# Patient Record
Sex: MALE | Race: WHITE | Employment: UNEMPLOYED | URBAN - NONMETROPOLITAN AREA
[De-identification: names, ages, dates, MRNs, and addresses within clinical notes are randomized per-mention and may not be internally consistent; named-entity substitution may affect disease eponyms.]

---

## 2020-06-11 ENCOUNTER — OFFICE VISIT (OUTPATIENT)
Age: 35
End: 2020-06-11

## 2020-06-11 VITALS — OXYGEN SATURATION: 98 % | TEMPERATURE: 97.8 F | HEART RATE: 86 BPM

## 2020-06-11 NOTE — PROGRESS NOTES
Patient was not seen//assessed by provider in the flu clinic today. COVID swab was order in compliance with requirement for testing prior to surgery or invasive procedure. Nasopharyngeal swab was collected and ordered through [facility] vendor.

## 2020-06-14 LAB
REPORT: NORMAL
SARS-COV-2: NOT DETECTED
THIS TEST SENT TO: NORMAL

## 2024-06-24 ENCOUNTER — OFFICE VISIT (OUTPATIENT)
Age: 39
End: 2024-06-24
Payer: COMMERCIAL

## 2024-06-24 VITALS
WEIGHT: 189 LBS | SYSTOLIC BLOOD PRESSURE: 128 MMHG | RESPIRATION RATE: 20 BRPM | OXYGEN SATURATION: 98 % | TEMPERATURE: 98.3 F | HEART RATE: 95 BPM | HEIGHT: 73 IN | BODY MASS INDEX: 25.05 KG/M2 | DIASTOLIC BLOOD PRESSURE: 78 MMHG

## 2024-06-24 DIAGNOSIS — S61.330A: Primary | ICD-10-CM

## 2024-06-24 PROCEDURE — 90715 TDAP VACCINE 7 YRS/> IM: CPT

## 2024-06-24 PROCEDURE — 90471 IMMUNIZATION ADMIN: CPT

## 2024-06-24 PROCEDURE — 99212 OFFICE O/P EST SF 10 MIN: CPT

## 2024-06-24 ASSESSMENT — ENCOUNTER SYMPTOMS: COLOR CHANGE: 0

## 2024-06-24 NOTE — PATIENT INSTRUCTIONS
- Tetanus vaccine administered during clinic visit.  - Wound covered with band aide.  - Keep area clean and dry.  - Cover while working.  - Cleanse with antimicrobial soap and water.  - Monitor for any development of swelling, erythema or drainage.    - Tetanus injection administered during clinic visit.   - Pain, irritation, and swelling at injection site can be normal.   - Risks and benefits discussed with patient.   - Patient instructed to wait in clinic 15 minutes after administration.    - Return to the clinic or follow up with PCP if symptoms worsen or fail to improve.

## 2024-06-24 NOTE — PROGRESS NOTES
[facility]  [facility]  [address] [place] [id number]  Dept: [phone]  Dept Fax: [phone]  Loc: [phone]    Lane Bailey is a 39 y.o. male who presents today for his medical conditions/complaints as noted below.  Lane Bailey is c/o of Puncture Wound (Paper clip through and through right index)        HPI:     Lane Bailey presents with complaints of puncture to right index finger by paperclip. Reports he pulled object out of finger, object still intact. Reports bleeding is controlled. Denies any recent antibiotic or steroid administration. Reports tetanus vaccine is not UTD.      History reviewed. No pertinent past medical history.  Past Surgical History:   Procedure Laterality Date    TONSILLECTOMY      WISDOM TOOTH EXTRACTION         History reviewed. No pertinent family history.    Social History     Tobacco Use    Smoking status: Never    Smokeless tobacco: Never   Substance Use Topics    Alcohol use: Yes     Alcohol/week: 10.0 standard drinks of alcohol     Types: 10 Cans of beer per week      No current outpatient medications on file.     No current facility-administered medications for this visit.     Allergies   Allergen Reactions    Sulfa Antibiotics      Been told since childhood       Health Maintenance   Topic Date Due    Varicella vaccine (1 of 2 - 2-dose childhood series) Never done    Depression Screen  Never done    HIV screen  Never done    Hepatitis C screen  Never done    DTaP/Tdap/Td vaccine (1 - Tdap) Never done    COVID-19 Vaccine (3 - 2023-24 season) 09/01/2023    Flu vaccine (Season Ended) 08/01/2024    Hepatitis B vaccine  Completed    Hepatitis A vaccine  Aged Out    Hib vaccine  Aged Out    HPV vaccine  Aged Out    Polio vaccine  Aged Out    Meningococcal (ACWY) vaccine  Aged Out    Pneumococcal 0-64 years Vaccine  Aged Out       Subjective:     Review of Systems   Constitutional:  Negative for chills and fever.

## 2024-07-03 ENCOUNTER — OFFICE VISIT (OUTPATIENT)
Age: 39
End: 2024-07-03
Payer: COMMERCIAL

## 2024-07-03 VITALS
DIASTOLIC BLOOD PRESSURE: 84 MMHG | BODY MASS INDEX: 24.94 KG/M2 | OXYGEN SATURATION: 98 % | TEMPERATURE: 97.6 F | SYSTOLIC BLOOD PRESSURE: 130 MMHG | HEART RATE: 92 BPM | RESPIRATION RATE: 20 BRPM | WEIGHT: 189 LBS

## 2024-07-03 DIAGNOSIS — S50.02XA CONTUSION OF LEFT ELBOW, INITIAL ENCOUNTER: ICD-10-CM

## 2024-07-03 DIAGNOSIS — S01.01XA LACERATION OF SCALP WITHOUT FOREIGN BODY, INITIAL ENCOUNTER: Primary | ICD-10-CM

## 2024-07-03 PROCEDURE — 99213 OFFICE O/P EST LOW 20 MIN: CPT

## 2024-07-03 ASSESSMENT — ENCOUNTER SYMPTOMS
COLOR CHANGE: 0
SHORTNESS OF BREATH: 0

## 2024-07-03 NOTE — PROGRESS NOTES
alert.   Psychiatric:         Mood and Affect: Mood normal.           /84   Pulse 92   Temp 97.6 °F (36.4 °C) (Temporal)   Resp 20   Wt 85.7 kg (189 lb)   SpO2 98%   BMI 24.94 kg/m²     :Assessment   Assessment & Plan    Diagnosis Orders   1. Laceration of scalp without foreign body, initial encounter  mupirocin (BACTROBAN) 2 % ointment      2. Contusion of left elbow, initial encounter  mupirocin (BACTROBAN) 2 % ointment          :Plan   Lacerations appears to be well approximated with edges starting to adhere together appropriately. No sutures necessary. Dermabond applied to left scalp laceration for more adherence of wound edges. No apparent bleeding or signs of infection noted. No x-ray warranted for left elbow injury, several abrasions present without signs of injection.    - Apply Mupirocin ointment as discussed.   - Leave the skin adhesive on your skin until it falls off on its own. This may take 5 to 10 days.  - Do not scratch, rub, or pick at the adhesive.  - Do not put the sticky part of a bandage directly on the adhesive.  - Tetanus vaccine UTD.  - Monitor for any signs of wound infection such as erythema, swelling or purulent drainage.  - Return to the clinic or follow up with PCP if symptoms worsen or fail to improve.     Patient provided educational materials- see patient instructions.  Discussed administration, benefit, and side effects of any prescribed or OTC medications.  All patient questions answered appropriately.  Patient voiced understanding.     Return if symptoms worsen or fail to improve.    Urgent Care evaluation today is not a substitute for PCP visit. Follow up care is the responsibility of the patient to discuss and review this Urgent Care visit.    No orders of the defined types were placed in this encounter.      No results found for this visit on 07/03/24.    Orders Placed This Encounter   Medications    mupirocin (BACTROBAN) 2 % ointment     Sig: Apply topically 2 times

## 2024-07-03 NOTE — PATIENT INSTRUCTIONS
- Apply Mupirocin ointment as discussed.   - Leave the skin adhesive on your skin until it falls off on its own. This may take 5 to 10 days.  - Do not scratch, rub, or pick at the adhesive.  - Do not put the sticky part of a bandage directly on the adhesive.  - Tetanus vaccine UTD.  - Monitor for any signs of wound infection such as erythema, swelling or purulent drainage.  - Return to the clinic or follow up with PCP if symptoms worsen or fail to improve.